# Patient Record
Sex: FEMALE | Race: BLACK OR AFRICAN AMERICAN | NOT HISPANIC OR LATINO
[De-identification: names, ages, dates, MRNs, and addresses within clinical notes are randomized per-mention and may not be internally consistent; named-entity substitution may affect disease eponyms.]

---

## 2017-04-05 ENCOUNTER — RESULT REVIEW (OUTPATIENT)
Age: 32
End: 2017-04-05

## 2017-08-10 ENCOUNTER — EMERGENCY (EMERGENCY)
Facility: HOSPITAL | Age: 32
LOS: 0 days | Discharge: HOME | End: 2017-08-10
Attending: EMERGENCY MEDICINE
Payer: COMMERCIAL

## 2017-08-10 VITALS
SYSTOLIC BLOOD PRESSURE: 113 MMHG | RESPIRATION RATE: 18 BRPM | WEIGHT: 198.42 LBS | TEMPERATURE: 98 F | HEART RATE: 83 BPM | OXYGEN SATURATION: 100 % | DIASTOLIC BLOOD PRESSURE: 67 MMHG

## 2017-08-10 PROCEDURE — 99283 EMERGENCY DEPT VISIT LOW MDM: CPT

## 2017-08-10 RX ORDER — IBUPROFEN 200 MG
1 TABLET ORAL
Qty: 20 | Refills: 0 | OUTPATIENT
Start: 2017-08-10 | End: 2017-08-15

## 2017-08-10 RX ORDER — IBUPROFEN 200 MG
600 TABLET ORAL ONCE
Qty: 0 | Refills: 0 | Status: COMPLETED | OUTPATIENT
Start: 2017-08-10 | End: 2017-08-10

## 2017-08-10 RX ADMIN — Medication 600 MILLIGRAM(S): at 22:30

## 2017-08-10 RX ADMIN — Medication 600 MILLIGRAM(S): at 22:49

## 2017-08-10 NOTE — ED ADULT NURSE NOTE - CHPI ED SYMPTOMS NEG
no weakness/no tingling/no abrasion/no stiffness/no deformity/no back pain/no fever/no numbness/no bruising

## 2017-08-10 NOTE — ED PROVIDER NOTE - MEDICAL DECISION MAKING DETAILS
Ddx: No tenderness and xrays negative to suggest fx/ No warmth for gout/ May be rheumatological d/o, pending rheum lab followup  Plan: Nsaids, ace wrap, d/c to fu w ortho and rheum. Pt declines xrays, has already had negative.

## 2017-08-10 NOTE — ED PROVIDER NOTE - MUSCULOSKELETAL MINIMAL EXAM
Slight swelling. No erythema, no warmth. No focal tenderness. Able to ambulate and bear weight.  No calf tenderness./atraumatic
Statement Selected

## 2017-08-10 NOTE — ED ADULT NURSE NOTE - OBJECTIVE STATEMENT
33 yo female c/o L ankle pain, denies injury/trauma, pt reports seen and tx by  Rheumatology, PCP, sono and labs completed and pt is awaiting results. told it may be  lymphedema, pt reports two weeks ago L knee was swollen and inflamed.

## 2017-08-10 NOTE — ED PROVIDER NOTE - OBJECTIVE STATEMENT
Pt is a 31 yo lady with no significant past medical history who presents to the ED with l. ankle pain and swelling for 3 weeks. Denies any trauma. Has had negative xrays, had US done, which was negative, has seen rheumatology and labs taken and pending. Has just been taking tylenol and ibuprofen for pain, which improves it. No redness, no warmth. No hx of gout, no fevers.

## 2017-08-11 DIAGNOSIS — Z88.0 ALLERGY STATUS TO PENICILLIN: ICD-10-CM

## 2017-08-11 DIAGNOSIS — M25.572 PAIN IN LEFT ANKLE AND JOINTS OF LEFT FOOT: ICD-10-CM

## 2017-08-11 DIAGNOSIS — G89.29 OTHER CHRONIC PAIN: ICD-10-CM

## 2023-02-28 ENCOUNTER — NON-APPOINTMENT (OUTPATIENT)
Age: 38
End: 2023-02-28

## 2023-08-29 ENCOUNTER — NON-APPOINTMENT (OUTPATIENT)
Age: 38
End: 2023-08-29